# Patient Record
Sex: FEMALE | Race: WHITE | NOT HISPANIC OR LATINO
[De-identification: names, ages, dates, MRNs, and addresses within clinical notes are randomized per-mention and may not be internally consistent; named-entity substitution may affect disease eponyms.]

---

## 2017-01-22 ENCOUNTER — APPOINTMENT (OUTPATIENT)
Dept: MRI IMAGING | Facility: CLINIC | Age: 72
End: 2017-01-22

## 2017-01-22 ENCOUNTER — OUTPATIENT (OUTPATIENT)
Dept: OUTPATIENT SERVICES | Facility: HOSPITAL | Age: 72
LOS: 1 days | End: 2017-01-22
Payer: MEDICARE

## 2017-01-22 DIAGNOSIS — K86.2 CYST OF PANCREAS: ICD-10-CM

## 2017-01-22 PROCEDURE — 74183 MRI ABD W/O CNTR FLWD CNTR: CPT

## 2017-01-22 PROCEDURE — A9585: CPT

## 2017-01-22 PROCEDURE — 82565 ASSAY OF CREATININE: CPT

## 2017-06-19 ENCOUNTER — APPOINTMENT (OUTPATIENT)
Dept: DERMATOLOGY | Facility: CLINIC | Age: 72
End: 2017-06-19

## 2017-12-11 ENCOUNTER — APPOINTMENT (OUTPATIENT)
Dept: PULMONOLOGY | Facility: CLINIC | Age: 72
End: 2017-12-11
Payer: MEDICARE

## 2017-12-11 VITALS — WEIGHT: 216 LBS | BODY MASS INDEX: 37.08 KG/M2

## 2017-12-11 VITALS
BODY MASS INDEX: 38.26 KG/M2 | DIASTOLIC BLOOD PRESSURE: 80 MMHG | HEIGHT: 63 IN | OXYGEN SATURATION: 96 % | SYSTOLIC BLOOD PRESSURE: 128 MMHG | HEART RATE: 78 BPM

## 2017-12-11 DIAGNOSIS — Z00.00 ENCOUNTER FOR GENERAL ADULT MEDICAL EXAMINATION W/OUT ABNORMAL FINDINGS: ICD-10-CM

## 2017-12-11 PROCEDURE — 99215 OFFICE O/P EST HI 40 MIN: CPT | Mod: 25

## 2017-12-11 PROCEDURE — 94010 BREATHING CAPACITY TEST: CPT

## 2017-12-22 ENCOUNTER — APPOINTMENT (OUTPATIENT)
Dept: DERMATOLOGY | Facility: CLINIC | Age: 72
End: 2017-12-22
Payer: MEDICARE

## 2017-12-22 PROCEDURE — 99213 OFFICE O/P EST LOW 20 MIN: CPT

## 2018-01-23 ENCOUNTER — APPOINTMENT (OUTPATIENT)
Dept: PULMONOLOGY | Facility: CLINIC | Age: 73
End: 2018-01-23
Payer: MEDICARE

## 2018-01-23 VITALS
HEIGHT: 63 IN | DIASTOLIC BLOOD PRESSURE: 70 MMHG | OXYGEN SATURATION: 97 % | WEIGHT: 213 LBS | SYSTOLIC BLOOD PRESSURE: 122 MMHG | BODY MASS INDEX: 37.74 KG/M2 | HEART RATE: 66 BPM

## 2018-01-23 PROCEDURE — 99215 OFFICE O/P EST HI 40 MIN: CPT

## 2018-01-23 RX ORDER — DILTIAZEM HYDROCHLORIDE 120 MG/1
120 CAPSULE, EXTENDED RELEASE ORAL
Qty: 90 | Refills: 0 | Status: ACTIVE | COMMUNITY
Start: 2017-04-10

## 2018-06-20 ENCOUNTER — APPOINTMENT (OUTPATIENT)
Dept: DERMATOLOGY | Facility: CLINIC | Age: 73
End: 2018-06-20

## 2018-07-25 ENCOUNTER — APPOINTMENT (OUTPATIENT)
Dept: DERMATOLOGY | Facility: CLINIC | Age: 73
End: 2018-07-25
Payer: MEDICARE

## 2018-07-25 PROCEDURE — 99214 OFFICE O/P EST MOD 30 MIN: CPT | Mod: 25

## 2018-07-25 PROCEDURE — 17003 DESTRUCT PREMALG LES 2-14: CPT

## 2018-07-25 PROCEDURE — 17000 DESTRUCT PREMALG LESION: CPT

## 2018-11-02 ENCOUNTER — APPOINTMENT (OUTPATIENT)
Dept: DERMATOLOGY | Facility: CLINIC | Age: 73
End: 2018-11-02

## 2019-01-25 ENCOUNTER — RESULT REVIEW (OUTPATIENT)
Age: 74
End: 2019-01-25

## 2019-01-25 ENCOUNTER — APPOINTMENT (OUTPATIENT)
Dept: DERMATOLOGY | Facility: CLINIC | Age: 74
End: 2019-01-25
Payer: MEDICARE

## 2019-01-25 PROCEDURE — 99214 OFFICE O/P EST MOD 30 MIN: CPT | Mod: 25

## 2019-01-25 PROCEDURE — 11102 TANGNTL BX SKIN SINGLE LES: CPT

## 2019-03-15 ENCOUNTER — OUTPATIENT (OUTPATIENT)
Dept: OUTPATIENT SERVICES | Facility: HOSPITAL | Age: 74
LOS: 1 days | End: 2019-03-15
Payer: MEDICARE

## 2019-03-15 ENCOUNTER — APPOINTMENT (OUTPATIENT)
Dept: CT IMAGING | Facility: CLINIC | Age: 74
End: 2019-03-15
Payer: MEDICARE

## 2019-03-15 DIAGNOSIS — Z00.8 ENCOUNTER FOR OTHER GENERAL EXAMINATION: ICD-10-CM

## 2019-03-15 PROCEDURE — 70450 CT HEAD/BRAIN W/O DYE: CPT | Mod: 26

## 2019-03-15 PROCEDURE — 70450 CT HEAD/BRAIN W/O DYE: CPT

## 2019-03-29 ENCOUNTER — TRANSCRIPTION ENCOUNTER (OUTPATIENT)
Age: 74
End: 2019-03-29

## 2019-05-07 ENCOUNTER — APPOINTMENT (OUTPATIENT)
Dept: PULMONOLOGY | Facility: CLINIC | Age: 74
End: 2019-05-07
Payer: MEDICARE

## 2019-05-07 VITALS
OXYGEN SATURATION: 96 % | BODY MASS INDEX: 37.38 KG/M2 | WEIGHT: 211 LBS | HEART RATE: 86 BPM | SYSTOLIC BLOOD PRESSURE: 120 MMHG | DIASTOLIC BLOOD PRESSURE: 86 MMHG

## 2019-05-07 PROCEDURE — 99215 OFFICE O/P EST HI 40 MIN: CPT

## 2019-05-07 RX ORDER — AZITHROMYCIN 250 MG/1
250 TABLET, FILM COATED ORAL
Qty: 6 | Refills: 0 | Status: DISCONTINUED | COMMUNITY
Start: 2017-11-02 | End: 2019-05-07

## 2019-05-07 RX ORDER — ESCITALOPRAM OXALATE 5 MG/1
5 TABLET ORAL
Qty: 30 | Refills: 0 | Status: DISCONTINUED | COMMUNITY
Start: 2018-01-06 | End: 2019-05-07

## 2019-05-07 RX ORDER — CYCLOBENZAPRINE HYDROCHLORIDE 5 MG/1
5 TABLET, FILM COATED ORAL
Qty: 30 | Refills: 0 | Status: DISCONTINUED | COMMUNITY
Start: 2017-07-10 | End: 2019-05-07

## 2019-05-07 RX ORDER — PREDNISONE 50 MG/1
50 TABLET ORAL
Qty: 3 | Refills: 0 | Status: DISCONTINUED | COMMUNITY
Start: 2018-01-02 | End: 2019-05-07

## 2019-05-07 RX ORDER — COLD-HOT PACK
EACH MISCELLANEOUS
Refills: 0 | Status: ACTIVE | COMMUNITY

## 2019-05-07 RX ORDER — MAGNESIUM CHLORIDE 71.5 MG
TABLET, DELAYED RELEASE (ENTERIC COATED) ORAL
Refills: 0 | Status: ACTIVE | COMMUNITY

## 2019-05-07 RX ORDER — AMOXICILLIN 875 MG/1
875 TABLET, FILM COATED ORAL
Qty: 20 | Refills: 0 | Status: DISCONTINUED | COMMUNITY
Start: 2017-09-21 | End: 2019-05-07

## 2019-05-07 NOTE — PROCEDURE
[FreeTextEntry1] : Facility: [facility]\par  \par CT chest without contrast\par  \par History:   Abnormal chest CT\par  \par Technique:  Multidetector CT images obtained of the chest without intravenous contrast. Images reformatted in the coronal and sagittal planes.\par  \par This CT exam was performed using one or more the following dose reduction techniques: Automated exposure control, adjustment of the mA and/or kV according to patient's size, use of iterative reconstruction technique. Estimated dose for this examination in total is dose length product (DLP) = 277 mGy-cm.\par  \par Comparison:  1/16/2018.\par  \par Findings:\par  \par 8 x 8 x 10 mm nodule in the lingula (axial image 36, coronal image 64, sagittal image 88), measures slightly larger than on prior study (previously 7 x 7 x 9 mm if measured in similar fashion). Although measurement difference is minimal, recommend further evaluation with PET CT or short-term CT follow-up.\par  \par Left lung granuloma again noted. No new suspicious pulmonary nodules or masses identified. Additional mild dependent and peripheral densities most compatible with atelectasis and scarring. No pleural effusions.\par  \par Limited evaluation of mediastinal and vascular structures without contrast. Loop recorder. Heart is not enlarged. No pericardial effusion noted. Ascending thoracic aorta measures up to 3.2 cm. No mediastinal or axillary lymphadenopathy noted.\par  \par Included upper abdomen demonstrates cholecystectomy clips. No adrenal masses visualized. \par  \par Mild degenerative osseous changes. Stable T12 superior endplate mild compression deformity or Schmorl's node.\par  \par Impression:\par 1. 8 x 8 x 10 mm nodule in the lingula, measures slightly larger than on prior study (previously 7 x 7 x 9 mm if measured in similar fashion). Although difference is minimal, recommend further evaluation with PET-CT or additional CT follow-up.\par  \par 2. Otherwise no significant change. Other findings as above.\par  \par Signed by \par \par Signed	Date/Time		Phone	Pager\par TAMIA MI				\par \par Facility: [facility]\par  \par ESOPHAGRAM\par  \par Clinical Information:73 years old Female presents with Dysphagia, unspecified, Gastroesophageal reflux disease without esophagitis;    \par  \par Comparison: CT chest performed 1/16/2019\par  \par Technique:  radiograph was obtained. Double contrast esophagram evaluation was performed. Fluoroscopy performed by dictating radiologist. Permanent images were acquired.   \par  \par Findings:  radiograph does not demonstrate radiopaque foreign body or evidence of dilated esophagus. Tortuous aorta. Right upper quadrant surgical clips. Electronic device projects over the left suprahilar region.\par  \par Mild esophageal dysmotility was observed without evidence of focal stricture or obstructing intraluminal mass lesion. Correlate clinically with upper endoscopy as warranted. Small, sliding-type hiatal hernia. Contrast flowed freely into the stomach. Mild gastroesophageal reflux was observed. \par  \par FLUORO TIME= 78 seconds\par NUMBER OF IMAGES= 25\par  \par Impression:\par  \par 1. Mild esophageal dysmotility without evidence of focal stricture or obstructing intraluminal mass lesion.\par 2. Mild gastroesophageal reflux was observed.\par  \par Signed by \par \par Signed	Date/Time		Phone	Pager\par JONATHAN DENNEY				\par \par Chest CAT scan reviewed on PACS with the patient.\par

## 2019-05-07 NOTE — DISCUSSION/SUMMARY
[FreeTextEntry1] : 73-year-old female, seen today for the above. Patient's underlying lung lesion had increased by 1 mm in size, but remains well demarcated with a change, most likely due to technique. Followup CAT scan will be performed in July. Case reviewed on PACS with patient

## 2019-05-07 NOTE — PHYSICAL EXAM
[Normal Appearance] : normal appearance [Well Groomed] : well groomed [General Appearance - Well Developed] : well developed [General Appearance - Well Nourished] : well nourished [No Deformities] : no deformities [General Appearance - In No Acute Distress] : no acute distress [Normal Oropharynx] : normal oropharynx [Neck Appearance] : the appearance of the neck was normal [III] : III [Neck Cervical Mass (___cm)] : no neck mass was observed [Jugular Venous Distention Increased] : there was no jugular-venous distention [Thyroid Diffuse Enlargement] : the thyroid was not enlarged [Thyroid Nodule] : there were no palpable thyroid nodules [Neck Circumference: ___] : neck circumference is [unfilled] [Heart Rate And Rhythm] : heart rate and rhythm were normal [Heart Sounds] : normal S1 and S2 [Murmurs] : no murmurs present [Respiration, Rhythm And Depth] : normal respiratory rhythm and effort [Exaggerated Use Of Accessory Muscles For Inspiration] : no accessory muscle use [Auscultation Breath Sounds / Voice Sounds] : lungs were clear to auscultation bilaterally [Abdomen Soft] : soft [Abdomen Tenderness] : non-tender [Abdomen Mass (___ Cm)] : no abdominal mass palpated [Gait - Sufficient For Exercise Testing] : the gait was sufficient for exercise testing [Abnormal Walk] : normal gait [Nail Clubbing] : no clubbing of the fingernails [Cyanosis, Localized] : no localized cyanosis [] : no ischemic changes [Petechial Hemorrhages (___cm)] : no petechial hemorrhages [Deep Tendon Reflexes (DTR)] : deep tendon reflexes were 2+ and symmetric [No Focal Deficits] : no focal deficits [Sensation] : the sensory exam was normal to light touch and pinprick

## 2019-05-07 NOTE — CONSULT LETTER
[Dear  ___] : Dear  [unfilled], [Courtesy Letter:] : I had the pleasure of seeing your patient, [unfilled], in my office today. [Please see my note below.] : Please see my note below. [Sincerely,] : Sincerely, [Vince Loco MD] : Vince Loco MD [FreeTextEntry3] : Vince Loco MD FCCP\par Pulmonary/Critical Care/Sleep Medicine\par Department of Internal Medicine\par \par [facility]\par

## 2019-05-07 NOTE — REASON FOR VISIT
[Follow-Up] : a follow-up visit [Shortness of Breath] : shortness of Breath [Abnormal CT Scan] : abnormal CT Scan

## 2019-05-07 NOTE — HISTORY OF PRESENT ILLNESS
[FreeTextEntry1] : 72-year-old female, seen today in followup of her pulmonary nodule. Patient has not undergone a sleep study at because of fear of losing her hair with the adhesive used for EEG portion. She denies any complaints of cough, wheeze, sputum, fevers, chills, chest pains

## 2019-07-17 ENCOUNTER — APPOINTMENT (OUTPATIENT)
Dept: PULMONOLOGY | Facility: CLINIC | Age: 74
End: 2019-07-17
Payer: MEDICARE

## 2019-07-17 VITALS
HEART RATE: 75 BPM | DIASTOLIC BLOOD PRESSURE: 70 MMHG | OXYGEN SATURATION: 95 % | SYSTOLIC BLOOD PRESSURE: 128 MMHG | WEIGHT: 217 LBS | BODY MASS INDEX: 38.44 KG/M2

## 2019-07-17 PROCEDURE — 99215 OFFICE O/P EST HI 40 MIN: CPT

## 2019-07-17 NOTE — PROCEDURE
[FreeTextEntry1] : Study Result \par \par  \par Facility: [facility]\par  \par  \par  \par CT Angiography Chest with MIP on 6/19/2019:\par  \par Clinical indication:Chest pain; Abdominal pain; Epigastric; \par Additional info: Angio of aorta\par  \par Comparison: 11/12/2017\par  \par Multidetector helical sections were obtained. This was performed \par following IV administration of 100  cc of Omnipaque 350. This was \par performed from the lung apices to the lung bases.  Axial, sagittal \par and coronal reconstructions were obtained.\par  \par Dose Information: Estimated dose for this examination in total is \par DLP 1241 mGy-cm. \par  \par Dose lowering technique: One or more of the following were used, \par automated exposure control, adjustment of the MA and/ or KV \par according to patient size and use of iterative reconstruction \par technique.\par  \par Findings:\par  \par The visualized thyroid gland is unremarkable.\par  \par There is no consolidation.\par  \par There is an 8mm nodule in the area lingular segment of the left \par lung on axial image 72 series 3.  This nodule is of indeterminate \par etiology.  This was present on a prior study dated 11/20/2017.\par  \par Linear atelectasis is suggested in the lingular segment of the left \par lung.\par  \par Pleural thickening and scarring is identified at both lung apices.\par  \par There is no evidence of pulmonary embolism.  \par  \par There is no aortic dissection.\par There is no aortic aneurysm.\par  \par There is no hilar or mediastinal lymphadenopathy.\par  \par There is no right or left pleural effusion.\par  \par The pericardium is unremarkable.\par  \par There is no axillary lymphadenopathy.\par  \par The adrenal glands are unremarkable.  \par  \par A mild compression fracture of T12 is identified which appear to be \par old.\par Degenerative disc disease, thoracic spine.\par  \par Impression:\par  \par There is no evidence of pulmonary embolism.\par  \par There is an 8mm nodule in the lingular segment of the left lung.\par  \par There is no right or left pleural fluid present.\par  \par There is no aortic aneurysm.\par  \par There is no thoracic aortic dissection.\par  \par Other findings, as described above.\par  \par  \par THIS DOCUMENT HAS BEEN ELECTRONICALLY SIGNED BY DAVION GONZALEZ MD\par \par \par

## 2019-07-17 NOTE — CONSULT LETTER
[Dear  ___] : Dear  [unfilled], [Courtesy Letter:] : I had the pleasure of seeing your patient, [unfilled], in my office today. [Please see my note below.] : Please see my note below. [Sincerely,] : Sincerely, [Vince Loco MD] : Vince Loco MD [FreeTextEntry3] : Vince Loco MD FCCP\par Pulmonary/Critical Care/Sleep Medicine\par Department of Internal Medicine\par \par [facility]\par

## 2019-07-17 NOTE — DISCUSSION/SUMMARY
[FreeTextEntry1] : 73-year-old female, seen today for the above. Her chest. CAT scan has remained stable as most likely the basis of chronic scarring. The films were reviewed with the patient and  on PACS. A final CAT scan will be performed in one year.\par \par In regard to the issue of sleep apnea, she now agrees to undergo a home sleep study. Patient will be followed up thereafter

## 2019-07-17 NOTE — HISTORY OF PRESENT ILLNESS
[FreeTextEntry1] : 73-year-old female, seen today in followup of her chest CT. Patient did have a CAT scan done one month earlier secondary to an ER visit at [facility] for back pain. She denies any complaints of cough, wheeze, or sputum production.\par \par She continues to claim of snoring, as well as unrefreshing sleep, and excessive daytime somnolence. She is now willing to undergo a home sleep study

## 2019-07-17 NOTE — PHYSICAL EXAM
[General Appearance - Well Developed] : well developed [Normal Appearance] : normal appearance [Well Groomed] : well groomed [General Appearance - Well Nourished] : well nourished [No Deformities] : no deformities [General Appearance - In No Acute Distress] : no acute distress [Normal Oropharynx] : normal oropharynx [III] : III [Neck Appearance] : the appearance of the neck was normal [Neck Cervical Mass (___cm)] : no neck mass was observed [Jugular Venous Distention Increased] : there was no jugular-venous distention [Thyroid Diffuse Enlargement] : the thyroid was not enlarged [Thyroid Nodule] : there were no palpable thyroid nodules [Neck Circumference: ___] : neck circumference is [unfilled] [Heart Rate And Rhythm] : heart rate and rhythm were normal [Heart Sounds] : normal S1 and S2 [Murmurs] : no murmurs present [Respiration, Rhythm And Depth] : normal respiratory rhythm and effort [Exaggerated Use Of Accessory Muscles For Inspiration] : no accessory muscle use [Auscultation Breath Sounds / Voice Sounds] : lungs were clear to auscultation bilaterally [Abdomen Soft] : soft [Abdomen Tenderness] : non-tender [Abdomen Mass (___ Cm)] : no abdominal mass palpated [Abnormal Walk] : normal gait [Gait - Sufficient For Exercise Testing] : the gait was sufficient for exercise testing [Nail Clubbing] : no clubbing of the fingernails [Cyanosis, Localized] : no localized cyanosis [Petechial Hemorrhages (___cm)] : no petechial hemorrhages [] : no ischemic changes [Deep Tendon Reflexes (DTR)] : deep tendon reflexes were 2+ and symmetric [Sensation] : the sensory exam was normal to light touch and pinprick [No Focal Deficits] : no focal deficits

## 2019-07-26 ENCOUNTER — APPOINTMENT (OUTPATIENT)
Dept: DERMATOLOGY | Facility: CLINIC | Age: 74
End: 2019-07-26
Payer: MEDICARE

## 2019-07-26 PROCEDURE — 99214 OFFICE O/P EST MOD 30 MIN: CPT

## 2019-11-07 ENCOUNTER — APPOINTMENT (OUTPATIENT)
Dept: PULMONOLOGY | Facility: CLINIC | Age: 74
End: 2019-11-07
Payer: MEDICARE

## 2019-11-07 VITALS
RESPIRATION RATE: 16 BRPM | WEIGHT: 219 LBS | BODY MASS INDEX: 38.79 KG/M2 | OXYGEN SATURATION: 98 % | SYSTOLIC BLOOD PRESSURE: 132 MMHG | DIASTOLIC BLOOD PRESSURE: 80 MMHG | HEART RATE: 72 BPM

## 2019-11-07 PROCEDURE — 99213 OFFICE O/P EST LOW 20 MIN: CPT

## 2019-11-07 NOTE — CONSULT LETTER
[Dear  ___] : Dear  [unfilled], [Courtesy Letter:] : I had the pleasure of seeing your patient, [unfilled], in my office today. [Please see my note below.] : Please see my note below. [Sincerely,] : Sincerely, [FreeTextEntry3] : Vince Loco MD FCCP\par Pulmonary/Critical Care/Sleep Medicine\par Department of Internal Medicine\par \par [facility]\par  [Vince Loco MD] : Vince Loco MD

## 2019-11-07 NOTE — HISTORY OF PRESENT ILLNESS
[Obstructive Sleep Apnea] : obstructive sleep apnea [Witnessed Apneas] : witnessed sleep apnea [Snoring] : snoring [Awakes Unrefreshed] : awakening unrefreshed [Awakes with Headache] : headache upon awakening [Awakening With Dry Mouth] : awakening with dry mouth [Recent  Weight Gain] : recent weight gain [To Bed: ___] : ~he/she~ goes to bed at [unfilled] [Arises: ___] : arises at [unfilled] [FreeTextEntry1] : Did not have sleep study

## 2019-11-07 NOTE — PHYSICAL EXAM
[General Appearance - Well Developed] : well developed [Normal Appearance] : normal appearance [Well Groomed] : well groomed [General Appearance - Well Nourished] : well nourished [No Deformities] : no deformities [III] : III [General Appearance - In No Acute Distress] : no acute distress [Normal Oropharynx] : normal oropharynx [Neck Appearance] : the appearance of the neck was normal [Neck Cervical Mass (___cm)] : no neck mass was observed [Thyroid Nodule] : there were no palpable thyroid nodules [Jugular Venous Distention Increased] : there was no jugular-venous distention [Thyroid Diffuse Enlargement] : the thyroid was not enlarged [Neck Circumference: ___] : neck circumference is [unfilled] [Heart Rate And Rhythm] : heart rate and rhythm were normal [Heart Sounds] : normal S1 and S2 [Murmurs] : no murmurs present [Respiration, Rhythm And Depth] : normal respiratory rhythm and effort [Exaggerated Use Of Accessory Muscles For Inspiration] : no accessory muscle use [Auscultation Breath Sounds / Voice Sounds] : lungs were clear to auscultation bilaterally [Abdomen Tenderness] : non-tender [Abdomen Soft] : soft [Abdomen Mass (___ Cm)] : no abdominal mass palpated [Abnormal Walk] : normal gait [Gait - Sufficient For Exercise Testing] : the gait was sufficient for exercise testing [Cyanosis, Localized] : no localized cyanosis [Nail Clubbing] : no clubbing of the fingernails [Petechial Hemorrhages (___cm)] : no petechial hemorrhages [] : no ischemic changes [Sensation] : the sensory exam was normal to light touch and pinprick [No Focal Deficits] : no focal deficits [Deep Tendon Reflexes (DTR)] : deep tendon reflexes were 2+ and symmetric

## 2020-01-21 ENCOUNTER — APPOINTMENT (OUTPATIENT)
Dept: PULMONOLOGY | Facility: CLINIC | Age: 75
End: 2020-01-21
Payer: MEDICARE

## 2020-01-21 VITALS — DIASTOLIC BLOOD PRESSURE: 78 MMHG | HEART RATE: 79 BPM | SYSTOLIC BLOOD PRESSURE: 142 MMHG | OXYGEN SATURATION: 98 %

## 2020-01-21 VITALS — HEIGHT: 63.5 IN | WEIGHT: 222 LBS | BODY MASS INDEX: 38.85 KG/M2

## 2020-01-21 DIAGNOSIS — G47.10 HYPERSOMNIA, UNSPECIFIED: ICD-10-CM

## 2020-01-21 PROCEDURE — 99214 OFFICE O/P EST MOD 30 MIN: CPT | Mod: 25

## 2020-01-21 PROCEDURE — 94010 BREATHING CAPACITY TEST: CPT

## 2020-01-21 NOTE — DISCUSSION/SUMMARY
[Obstructive Sleep Apnea] : obstructive sleep apnea [Alcohol Avoidance] : alcohol avoidance [Severe] : severe [Sedative Avoidance] : sedative avoidance [Weight Loss Program] : weight loss program [CPAP] : CPAP [de-identified] : Resmed Airsense 10 autoset (for her) in a range 4-16 with N20 mask. The pathophysiology of sleep was explained to the patient in detail. Inclusive of this was the reasoning behind and the expected response to positive airway pressure therapy. Compliance was outlined including further followup [FreeTextEntry1] : Pending f/u CCT

## 2020-01-21 NOTE — PHYSICAL EXAM
[General Appearance - Well Developed] : well developed [Well Groomed] : well groomed [Normal Appearance] : normal appearance [General Appearance - Well Nourished] : well nourished [General Appearance - In No Acute Distress] : no acute distress [No Deformities] : no deformities [Neck Appearance] : the appearance of the neck was normal [Normal Oropharynx] : normal oropharynx [III] : III [Neck Cervical Mass (___cm)] : no neck mass was observed [Jugular Venous Distention Increased] : there was no jugular-venous distention [Thyroid Diffuse Enlargement] : the thyroid was not enlarged [Thyroid Nodule] : there were no palpable thyroid nodules [Neck Circumference: ___] : neck circumference is [unfilled] [Heart Sounds] : normal S1 and S2 [Heart Rate And Rhythm] : heart rate and rhythm were normal [Murmurs] : no murmurs present [Respiration, Rhythm And Depth] : normal respiratory rhythm and effort [Exaggerated Use Of Accessory Muscles For Inspiration] : no accessory muscle use [Auscultation Breath Sounds / Voice Sounds] : lungs were clear to auscultation bilaterally [Abdomen Soft] : soft [Abdomen Tenderness] : non-tender [Abdomen Mass (___ Cm)] : no abdominal mass palpated [Abnormal Walk] : normal gait [Nail Clubbing] : no clubbing of the fingernails [Gait - Sufficient For Exercise Testing] : the gait was sufficient for exercise testing [Cyanosis, Localized] : no localized cyanosis [Petechial Hemorrhages (___cm)] : no petechial hemorrhages [] : no ischemic changes [Deep Tendon Reflexes (DTR)] : deep tendon reflexes were 2+ and symmetric [Sensation] : the sensory exam was normal to light touch and pinprick [No Focal Deficits] : no focal deficits

## 2020-01-21 NOTE — HISTORY OF PRESENT ILLNESS
[Obstructive Sleep Apnea] : obstructive sleep apnea [Snoring] : snoring [Witnessed Apneas] : witnessed sleep apnea [Awakes Unrefreshed] : awakening unrefreshed [Awakes with Headache] : headache upon awakening [Awakening With Dry Mouth] : awakening with dry mouth [Recent  Weight Gain] : recent weight gain [To Bed: ___] : ~he/she~ goes to bed at [unfilled] [Arises: ___] : arises at [unfilled] [FreeTextEntry1] : s/p HST

## 2020-01-21 NOTE — CONSULT LETTER
[Dear  ___] : Dear  [unfilled], [Courtesy Letter:] : I had the pleasure of seeing your patient, [unfilled], in my office today. [Please see my note below.] : Please see my note below. [Sincerely,] : Sincerely, [Vince Loco MD] : Vince Loco MD [FreeTextEntry3] : Vince Loco MD FCCP\par Pulmonary/Critical Care/Sleep Medicine\par Department of Internal Medicine\par \par [facility]\par

## 2020-06-17 ENCOUNTER — APPOINTMENT (OUTPATIENT)
Dept: CT IMAGING | Facility: CLINIC | Age: 75
End: 2020-06-17

## 2020-07-01 ENCOUNTER — APPOINTMENT (OUTPATIENT)
Dept: CT IMAGING | Facility: CLINIC | Age: 75
End: 2020-07-01
Payer: MEDICARE

## 2020-07-01 ENCOUNTER — OUTPATIENT (OUTPATIENT)
Dept: OUTPATIENT SERVICES | Facility: HOSPITAL | Age: 75
LOS: 1 days | End: 2020-07-01
Payer: MEDICARE

## 2020-07-01 ENCOUNTER — RESULT REVIEW (OUTPATIENT)
Age: 75
End: 2020-07-01

## 2020-07-01 DIAGNOSIS — Z00.00 ENCOUNTER FOR GENERAL ADULT MEDICAL EXAMINATION WITHOUT ABNORMAL FINDINGS: ICD-10-CM

## 2020-07-01 DIAGNOSIS — R93.89 ABNORMAL FINDINGS ON DIAGNOSTIC IMAGING OF OTHER SPECIFIED BODY STRUCTURES: ICD-10-CM

## 2020-07-01 PROCEDURE — 71250 CT THORAX DX C-: CPT

## 2020-07-01 PROCEDURE — 71250 CT THORAX DX C-: CPT | Mod: 26

## 2020-07-09 ENCOUNTER — APPOINTMENT (OUTPATIENT)
Dept: NUCLEAR MEDICINE | Facility: CLINIC | Age: 75
End: 2020-07-09
Payer: MEDICARE

## 2020-07-09 ENCOUNTER — OUTPATIENT (OUTPATIENT)
Dept: OUTPATIENT SERVICES | Facility: HOSPITAL | Age: 75
LOS: 1 days | End: 2020-07-09

## 2020-07-09 DIAGNOSIS — R93.89 ABNORMAL FINDINGS ON DIAGNOSTIC IMAGING OF OTHER SPECIFIED BODY STRUCTURES: ICD-10-CM

## 2020-07-09 PROCEDURE — 78815 PET IMAGE W/CT SKULL-THIGH: CPT | Mod: 26,PI

## 2020-07-27 ENCOUNTER — APPOINTMENT (OUTPATIENT)
Dept: DERMATOLOGY | Facility: CLINIC | Age: 75
End: 2020-07-27
Payer: MEDICARE

## 2020-07-27 PROCEDURE — 99214 OFFICE O/P EST MOD 30 MIN: CPT

## 2020-12-17 ENCOUNTER — APPOINTMENT (OUTPATIENT)
Dept: PULMONOLOGY | Facility: CLINIC | Age: 75
End: 2020-12-17

## 2021-01-12 ENCOUNTER — OUTPATIENT (OUTPATIENT)
Dept: OUTPATIENT SERVICES | Facility: HOSPITAL | Age: 76
LOS: 1 days | End: 2021-01-12
Payer: MEDICARE

## 2021-01-12 ENCOUNTER — APPOINTMENT (OUTPATIENT)
Dept: CT IMAGING | Facility: CLINIC | Age: 76
End: 2021-01-12
Payer: MEDICARE

## 2021-01-12 ENCOUNTER — RESULT REVIEW (OUTPATIENT)
Age: 76
End: 2021-01-12

## 2021-01-12 DIAGNOSIS — R91.1 SOLITARY PULMONARY NODULE: ICD-10-CM

## 2021-01-12 PROCEDURE — 71250 CT THORAX DX C-: CPT

## 2021-01-12 PROCEDURE — 71250 CT THORAX DX C-: CPT | Mod: 26

## 2021-01-20 ENCOUNTER — APPOINTMENT (OUTPATIENT)
Dept: PULMONOLOGY | Facility: CLINIC | Age: 76
End: 2021-01-20
Payer: MEDICARE

## 2021-01-20 VITALS
SYSTOLIC BLOOD PRESSURE: 120 MMHG | BODY MASS INDEX: 37.14 KG/M2 | HEART RATE: 73 BPM | DIASTOLIC BLOOD PRESSURE: 70 MMHG | OXYGEN SATURATION: 95 % | RESPIRATION RATE: 16 BRPM | WEIGHT: 213 LBS

## 2021-01-20 PROCEDURE — 99215 OFFICE O/P EST HI 40 MIN: CPT

## 2021-01-20 RX ORDER — FLUTICASONE PROPIONATE 50 UG/1
50 SPRAY, METERED NASAL
Qty: 48 | Refills: 0 | Status: COMPLETED | COMMUNITY
Start: 2017-07-10 | End: 2021-01-20

## 2021-01-20 RX ORDER — RANITIDINE 150 MG/1
150 TABLET ORAL
Qty: 90 | Refills: 0 | Status: COMPLETED | COMMUNITY
Start: 2017-04-06 | End: 2021-01-20

## 2021-01-20 NOTE — CONSULT LETTER
[Dear  ___] : Dear  [unfilled], [Courtesy Letter:] : I had the pleasure of seeing your patient, [unfilled], in my office today. [Please see my note below.] : Please see my note below. [Sincerely,] : Sincerely, [FreeTextEntry3] : Vince Loco MD FCCP\par Pulmonary/Critical Care/Sleep Medicine\par Department of Internal Medicine\par \par [facility]\par

## 2021-01-20 NOTE — PHYSICAL EXAM
[No Acute Distress] : no acute distress [Normal Oropharynx] : normal oropharynx [Normal Appearance] : normal appearance [No Neck Mass] : no neck mass [Normal Rate/Rhythm] : normal rate/rhythm [Normal S1, S2] : normal s1, s2 [No Murmurs] : no murmurs [No Resp Distress] : no resp distress [Clear to Auscultation Bilaterally] : clear to auscultation bilaterally [No Abnormalities] : no abnormalities [Benign] : benign [Normal Gait] : normal gait [No Clubbing] : no clubbing [No Cyanosis] : no cyanosis [No Edema] : no edema [FROM] : FROM [Normal Color/ Pigmentation] : normal color/ pigmentation [No Focal Deficits] : no focal deficits [Oriented x3] : oriented x3 [Normal Affect] : normal affect [IV] : Mallampati Class: IV

## 2021-01-20 NOTE — DISCUSSION/SUMMARY
[Obstructive Sleep Apnea] : obstructive sleep apnea [Severe] : severe [Alcohol Avoidance] : alcohol avoidance [Sedative Avoidance] : sedative avoidance [Weight Loss Program] : weight loss program [Patient] : discussed with the patient [de-identified] : restart CPAP. Resmed Airsense 10 autoset (for her) in a range 4-16 with N20 mask [FreeTextEntry1] : Pulm nodule stable x 2 yrs. Benign. No further w/u necessary

## 2021-01-20 NOTE — HISTORY OF PRESENT ILLNESS
[TextBox_4] : Patient never accepted her CPAP machine during the onset of Covid 19. She is here to consider reinitiation of therapy. She continues to complain of unrefreshing sleep, daytime somnolence, snoring, and restless sleep. [ESS] : 6

## 2021-02-04 ENCOUNTER — APPOINTMENT (OUTPATIENT)
Age: 76
End: 2021-02-04

## 2021-07-26 ENCOUNTER — APPOINTMENT (OUTPATIENT)
Dept: DERMATOLOGY | Facility: CLINIC | Age: 76
End: 2021-07-26
Payer: MEDICARE

## 2021-07-26 PROCEDURE — 99213 OFFICE O/P EST LOW 20 MIN: CPT

## 2021-10-06 ENCOUNTER — APPOINTMENT (OUTPATIENT)
Age: 76
End: 2021-10-06
Payer: MEDICARE

## 2021-10-06 DIAGNOSIS — M53.9 DORSOPATHY, UNSPECIFIED: ICD-10-CM

## 2021-10-06 DIAGNOSIS — K46.9 UNSPECIFIED ABDOMINAL HERNIA W/OUT OBSTRUCTION OR GANGRENE: ICD-10-CM

## 2021-10-06 DIAGNOSIS — S39.92XA UNSPECIFIED INJURY OF LOWER BACK, INITIAL ENCOUNTER: ICD-10-CM

## 2021-10-06 DIAGNOSIS — Z83.3 FAMILY HISTORY OF DIABETES MELLITUS: ICD-10-CM

## 2021-10-06 DIAGNOSIS — R35.1 NOCTURIA: ICD-10-CM

## 2021-10-06 DIAGNOSIS — R39.13 SPLITTING OF URINARY STREAM: ICD-10-CM

## 2021-10-06 DIAGNOSIS — K76.0 FATTY (CHANGE OF) LIVER, NOT ELSEWHERE CLASSIFIED: ICD-10-CM

## 2021-10-06 DIAGNOSIS — R33.9 RETENTION OF URINE, UNSPECIFIED: ICD-10-CM

## 2021-10-06 DIAGNOSIS — E78.00 PURE HYPERCHOLESTEROLEMIA, UNSPECIFIED: ICD-10-CM

## 2021-10-06 DIAGNOSIS — Z87.19 PERSONAL HISTORY OF OTHER DISEASES OF THE DIGESTIVE SYSTEM: ICD-10-CM

## 2021-10-06 DIAGNOSIS — E66.9 OBESITY, UNSPECIFIED: ICD-10-CM

## 2021-10-06 DIAGNOSIS — I51.9 HEART DISEASE, UNSPECIFIED: ICD-10-CM

## 2021-10-06 DIAGNOSIS — Z82.3 FAMILY HISTORY OF STROKE: ICD-10-CM

## 2021-10-06 DIAGNOSIS — R10.2 PELVIC AND PERINEAL PAIN: ICD-10-CM

## 2021-10-06 DIAGNOSIS — Z78.9 OTHER SPECIFIED HEALTH STATUS: ICD-10-CM

## 2021-10-06 DIAGNOSIS — R35.0 FREQUENCY OF MICTURITION: ICD-10-CM

## 2021-10-06 DIAGNOSIS — Z87.01 PERSONAL HISTORY OF PNEUMONIA (RECURRENT): ICD-10-CM

## 2021-10-06 DIAGNOSIS — Z83.6 FAMILY HISTORY OF OTHER DISEASES OF THE RESPIRATORY SYSTEM: ICD-10-CM

## 2021-10-06 DIAGNOSIS — Z83.49 FAMILY HISTORY OF OTHER ENDOCRINE, NUTRITIONAL AND METABOLIC DISEASES: ICD-10-CM

## 2021-10-06 DIAGNOSIS — R15.9 FULL INCONTINENCE OF FECES: ICD-10-CM

## 2021-10-06 LAB
BILIRUB UR QL STRIP: NEGATIVE
CLARITY UR: CLEAR
COLLECTION METHOD: NORMAL
GLUCOSE UR-MCNC: NEGATIVE
HCG UR QL: 0.2 EU/DL
HGB UR QL STRIP.AUTO: NORMAL
KETONES UR-MCNC: NEGATIVE
LEUKOCYTE ESTERASE UR QL STRIP: NORMAL
NITRITE UR QL STRIP: NEGATIVE
PH UR STRIP: 6
PROT UR STRIP-MCNC: NEGATIVE
SP GR UR STRIP: 1.02

## 2021-10-06 PROCEDURE — 99205 OFFICE O/P NEW HI 60 MIN: CPT | Mod: 25

## 2021-10-06 PROCEDURE — 51701 INSERT BLADDER CATHETER: CPT

## 2021-10-06 PROCEDURE — 81003 URINALYSIS AUTO W/O SCOPE: CPT | Mod: QW

## 2021-10-06 RX ORDER — FAMOTIDINE 10 MG/1
TABLET, FILM COATED ORAL
Refills: 0 | Status: ACTIVE | COMMUNITY

## 2021-10-06 RX ORDER — ZINC SULFATE 50(220)MG
CAPSULE ORAL
Refills: 0 | Status: ACTIVE | COMMUNITY

## 2021-10-06 RX ORDER — VIT C/ZN GLUC/HERBAL NO.325 90 MG-15MG
LOZENGE MUCOUS MEMBRANE
Refills: 0 | Status: ACTIVE | COMMUNITY

## 2021-10-06 RX ORDER — CONJUGATED ESTROGENS 0.62 MG/G
0.62 CREAM VAGINAL
Qty: 30 | Refills: 2 | Status: ACTIVE | COMMUNITY
Start: 2021-10-06 | End: 1900-01-01

## 2021-10-06 NOTE — PROCEDURE
[Straight Catheterization] : insertion of a straight catheter [Urgent Incontinence] : urgent incontinence [Patient] : the patient [___ Fr Straight Tip] : a [unfilled] in [place] straight tip catheter [None] : there were no complications with the catheter insertion [No Complications] : no complications [Tolerated Well] : the patient tolerated the procedure well [Post procedure instructions and information given] : Post procedure instructions and information were given and reviewed with patient. [0] : 0

## 2021-10-06 NOTE — PHYSICAL EXAM
[Chaperone Present] : A chaperone was present in the examining room during all aspects of the physical examination [No Acute Distress] : in no acute distress [Well developed] : well developed [Well Nourished] : ~L well nourished [Oriented x3] : oriented to person, place, and time [Normal Memory] : ~T memory was ~L unimpaired [Normal Mood/Affect] : mood and affect are normal [Cough] : no cough [No Edema] : ~T edema was not present [Tenderness] : ~T no ~M abdominal tenderness observed [Distended] : not distended [None] : no CVA tenderness [Inguinal LAD] : no adenopathy was noted in the inguinal lymph nodes [Warm and Dry] : was warm and dry to touch [Normal Gait] : gait was normal [Vulvar Atrophy] : vulvar atrophy [Labia Majora] : were normal [Labia Minora] : were normal [Atrophy] : atrophy [No Bleeding] : there was no active vaginal bleeding [1] : 1 [Aa ____] : Aa [unfilled] [Ba ____] : Ba [unfilled] [C ____] : C [unfilled] [GH ____] : GH [unfilled] [PB ____] : PB [unfilled] [TVL ____] : TVL  [unfilled] [Ap ____] : Ap [unfilled] [Bp ____] : Bp [unfilled] [D ____] : D [unfilled] [Soft] :  the cervix was soft [Normal] : no abnormalities [Post Void Residual ____ml] : post void residual was [unfilled] ml [FreeTextEntry3] : neg CST

## 2021-10-06 NOTE — HISTORY OF PRESENT ILLNESS
[Cystocele (Obstetric)] : no [Vaginal Wall Prolapse] : no [Rectal Prolapse] : no [Unable To Restrain Bowel Movement] : mild [x2] : nocturia two times a night [Urinary Tract Infection] : mild [Urinary Frequency] : no [Feelings Of Urinary Urgency] : no [Pain During Urination (Dysuria)] : no [Constipation Obstructed Defecation] : no [] : no [Pelvic Pain] : no [Vaginal Pain] : no [Vulvar Pain] : no [FreeTextEntry1] : \par Eleni presents for evaluation of UTI, she reports for the past few months she has had UTI symptoms, she reports that she has had a UTI for the past month,4 UTIs this past year,  she reports that she cannot take any abx bc she had a reaction to vantin with a rash, she reports that macrobid gives her a rash, was told that she cannot take levaquin due to reaction with an IV, she recently was seen by ID and was given monurol, she is taking it today , she reports that she has had UTIs her entire life, right now she has no symptoms, she reports the night before her symptoms start she gets a tingle and then the next am there is gross hematuria, she reports having a cystoscopy many years ago, she reports she is unsure if she empties, she reports leakage with coughing/urgency, she reports waking up 2-3 times at night, she reports rare pelvic pain, no bulge symptoms, wears a med pad all the time and most times it is dry, every now and then she will get UUI, denies constipation, one life time episode of fecal incontinence, had cologuard 2 yrs ago, but now needs to have a colonoscopyl\par \par she has a history of renal cyst- exophytic - stable and has been evaluated by urology\par \par last cx ecoli

## 2021-10-06 NOTE — DISCUSSION/SUMMARY
[FreeTextEntry1] : \par Eleni presents with recurrent UTIs, asymptomatic cystocele on exam, normal PVR. . We discussed etiology and management of recurrent urinary tract infections. We discussed behavioral modifications including increased oral intake, cranberry tablets, wiping front to back, d mannose. We discussed management of recurrent UTIs with vaginal estrogen and antibiotic prophylaxis. She is interested in vaginal estrogen and discussed risks/benefits and use.   We discussed that is she is symptomatic I prefer for catheterized specimen however if she cannot come to the office, discussed u/a and culture prior to antibiotic treatment. Will get cysto due to h/o hematuria. Has renal imaging. All questions were answered. \par \par [] allergist for abx allergies\par [] u/a, cx- taking monurol today, but asymptomatic \par [] vag estrogen\par [] cysto

## 2021-10-08 ENCOUNTER — NON-APPOINTMENT (OUTPATIENT)
Age: 76
End: 2021-10-08

## 2021-10-08 LAB
APPEARANCE: CLEAR
BACTERIA UR CULT: NORMAL
BACTERIA: NEGATIVE
BILIRUBIN URINE: NEGATIVE
BLOOD URINE: NEGATIVE
COLOR: YELLOW
GLUCOSE QUALITATIVE U: NEGATIVE
HYALINE CASTS: 0 /LPF
KETONES URINE: NEGATIVE
LEUKOCYTE ESTERASE URINE: NEGATIVE
MICROSCOPIC-UA: NORMAL
NITRITE URINE: NEGATIVE
PH URINE: 6
PROTEIN URINE: NEGATIVE
RED BLOOD CELLS URINE: 3 /HPF
SPECIFIC GRAVITY URINE: 1.02
SQUAMOUS EPITHELIAL CELLS: 0 /HPF
UROBILINOGEN URINE: NORMAL
WHITE BLOOD CELLS URINE: 1 /HPF

## 2021-11-10 ENCOUNTER — APPOINTMENT (OUTPATIENT)
Age: 76
End: 2021-11-10
Payer: MEDICARE

## 2021-11-10 DIAGNOSIS — R32 UNSPECIFIED URINARY INCONTINENCE: ICD-10-CM

## 2021-11-10 PROCEDURE — 52000 CYSTOURETHROSCOPY: CPT

## 2022-01-05 ENCOUNTER — APPOINTMENT (OUTPATIENT)
Dept: UROGYNECOLOGY | Facility: CLINIC | Age: 77
End: 2022-01-05
Payer: MEDICARE

## 2022-01-05 ENCOUNTER — RESULT CHARGE (OUTPATIENT)
Age: 77
End: 2022-01-05

## 2022-01-05 DIAGNOSIS — N39.0 URINARY TRACT INFECTION, SITE NOT SPECIFIED: ICD-10-CM

## 2022-01-05 DIAGNOSIS — R31.9 HEMATURIA, UNSPECIFIED: ICD-10-CM

## 2022-01-05 DIAGNOSIS — N39.41 URGE INCONTINENCE: ICD-10-CM

## 2022-01-05 LAB
BILIRUB UR QL STRIP: NEGATIVE
CLARITY UR: CLEAR
COLLECTION METHOD: NORMAL
GLUCOSE UR-MCNC: NEGATIVE
HCG UR QL: 0.2 EU/DL
HGB UR QL STRIP.AUTO: NORMAL
KETONES UR-MCNC: NEGATIVE
LEUKOCYTE ESTERASE UR QL STRIP: NORMAL
NITRITE UR QL STRIP: NEGATIVE
PH UR STRIP: 5.5
PROT UR STRIP-MCNC: NEGATIVE
SP GR UR STRIP: 1.01

## 2022-01-05 PROCEDURE — 51701 INSERT BLADDER CATHETER: CPT

## 2022-01-05 PROCEDURE — 99213 OFFICE O/P EST LOW 20 MIN: CPT | Mod: 25

## 2022-01-05 NOTE — PROCEDURE
[Straight Catheterization] : insertion of a straight catheter [Urinary Tract Infection] : a urinary tract infection [Hematuria] : hematuria [Patient] : the patient [___ Fr Straight Tip] : a [unfilled] in [place] straight tip catheter [None] : there were no complications with the catheter insertion [Clear] : clear [Culture] : culture [Urinalysis] : urinalysis [No Complications] : no complications [Tolerated Well] : the patient tolerated the procedure well [Post procedure instructions and information given] : Post procedure instructions and information were given and reviewed with patient. [0] : 0 [FreeTextEntry9] : Pt was unable to void in office.  150ml urine drained via cath

## 2022-01-05 NOTE — DISCUSSION/SUMMARY
[FreeTextEntry1] : In office dip indicates likely UTI.  Will send UA CS.  Pt has one dose of monurol at home and I did tell her that if symptoms worsen, she can take that.  If CS negative, will get CTU for gross hematuria.  We discussed in detail the risks/benefits of low dose vaginal estrogen and she is agreeable to start this.  Instructed to call with any questions or concerns and she verbalizes understanding.

## 2022-01-05 NOTE — HISTORY OF PRESENT ILLNESS
[FreeTextEntry1] : Pt presents to office for f/u on recurrent UTI.  She was seen for cysto 11/10/21 (negative) and advised to start vaginal estrogen at that time.  Per pt, she did not start this as she was very worried about potential SE and the fact that her mom  of breast cancer.  She c/o UUI, blood in the urine and dysuria yesterday.  She increased her water intake (60-70oz yesterday) and tried cystex with some relief and was feeling better around 2am but concerned she has a UTI today.  No further gross hematuria.  She is scheduled to see an allergist next week as she had a rash with vantin, levaquin and macrobid.  Has taken monurol in the past and did well.

## 2022-01-07 DIAGNOSIS — R31.29 OTHER MICROSCOPIC HEMATURIA: ICD-10-CM

## 2022-01-07 LAB
APPEARANCE: CLEAR
BACTERIA UR CULT: NORMAL
BACTERIA: NEGATIVE
BILIRUBIN URINE: NEGATIVE
BLOOD URINE: ABNORMAL
COLOR: NORMAL
GLUCOSE QUALITATIVE U: NEGATIVE
HYALINE CASTS: 0 /LPF
KETONES URINE: NEGATIVE
LEUKOCYTE ESTERASE URINE: ABNORMAL
MICROSCOPIC-UA: NORMAL
NITRITE URINE: NEGATIVE
PH URINE: 6
PROTEIN URINE: NORMAL
RED BLOOD CELLS URINE: 10 /HPF
SPECIFIC GRAVITY URINE: 1.01
SQUAMOUS EPITHELIAL CELLS: 1 /HPF
UROBILINOGEN URINE: NORMAL
WHITE BLOOD CELLS URINE: 20 /HPF

## 2022-01-10 ENCOUNTER — APPOINTMENT (OUTPATIENT)
Dept: UROGYNECOLOGY | Facility: CLINIC | Age: 77
End: 2022-01-10

## 2022-01-25 DIAGNOSIS — Z87.891 PERSONAL HISTORY OF NICOTINE DEPENDENCE: ICD-10-CM

## 2022-02-08 ENCOUNTER — APPOINTMENT (OUTPATIENT)
Dept: CT IMAGING | Facility: CLINIC | Age: 77
End: 2022-02-08
Payer: MEDICARE

## 2022-02-08 ENCOUNTER — OUTPATIENT (OUTPATIENT)
Dept: OUTPATIENT SERVICES | Facility: HOSPITAL | Age: 77
LOS: 1 days | End: 2022-02-08
Payer: MEDICARE

## 2022-02-08 DIAGNOSIS — R93.89 ABNORMAL FINDINGS ON DIAGNOSTIC IMAGING OF OTHER SPECIFIED BODY STRUCTURES: ICD-10-CM

## 2022-02-08 PROCEDURE — G1004: CPT

## 2022-02-08 PROCEDURE — 71250 CT THORAX DX C-: CPT | Mod: 26,MG

## 2022-02-08 PROCEDURE — 71250 CT THORAX DX C-: CPT | Mod: MG

## 2022-02-11 ENCOUNTER — NON-APPOINTMENT (OUTPATIENT)
Age: 77
End: 2022-02-11

## 2022-02-15 ENCOUNTER — APPOINTMENT (OUTPATIENT)
Dept: PULMONOLOGY | Facility: CLINIC | Age: 77
End: 2022-02-15
Payer: MEDICARE

## 2022-02-15 VITALS
OXYGEN SATURATION: 97 % | DIASTOLIC BLOOD PRESSURE: 80 MMHG | BODY MASS INDEX: 37.14 KG/M2 | HEART RATE: 69 BPM | WEIGHT: 213 LBS | SYSTOLIC BLOOD PRESSURE: 130 MMHG

## 2022-02-15 PROCEDURE — 99215 OFFICE O/P EST HI 40 MIN: CPT

## 2022-02-15 NOTE — END OF VISIT
[FreeTextEntry3] : PACS review with patient [Time Spent: ___ minutes] : I have spent [unfilled] minutes of time on the encounter.

## 2022-02-15 NOTE — HISTORY OF PRESENT ILLNESS
[Home] : home [TextBox_100] : 12/17/2019 [TextBox_108] : 30.2 [Former] : former [TextBox_4] : Seen today76-year-old female with a history of severe obstructive sleep apnea untreated for follow-up of a pulmonary nodule which was first seen in 2017.  At that time the nodule measured 7 mm in size.  Patient denies any complaints of cough wheeze shortness of breath chest pains palpitations lightheadedness or dizziness.

## 2022-02-15 NOTE — DISCUSSION/SUMMARY
[FreeTextEntry1] : 76-year-old female seen today for the above.  Patient's findings on CAT scan remain in question.  The lesion appears stable and PET negative over the course of the last 2 years.  There was a slight increase in size from 0858-9586 which may have been technical in origin.  In light of the lack of progression I am recommending the patient have follow-up CT in 1 year.\par \par In regard to her obstructive sleep apnea which was severe based on home studies in 2019 patient is disinterested in pursuing any further work-up or therapy.

## 2022-02-15 NOTE — CONSULT LETTER
[Dear  ___] : Dear  [unfilled], [Consult Letter:] : I had the pleasure of evaluating your patient, [unfilled]. [Please see my note below.] : Please see my note below. [Consult Closing:] : Thank you very much for allowing me to participate in the care of this patient.  If you have any questions, please do not hesitate to contact me. [Sincerely,] : Sincerely, [FreeTextEntry3] : Vince Loco MD FCCP\par Pulmonary/Critical Care/Sleep Medicine\par Department of Internal Medicine\par \par [facility]\par

## 2022-04-11 ENCOUNTER — APPOINTMENT (OUTPATIENT)
Dept: DERMATOLOGY | Facility: CLINIC | Age: 77
End: 2022-04-11
Payer: MEDICARE

## 2022-04-11 PROCEDURE — 99213 OFFICE O/P EST LOW 20 MIN: CPT

## 2022-07-18 ENCOUNTER — APPOINTMENT (OUTPATIENT)
Dept: PULMONOLOGY | Facility: CLINIC | Age: 77
End: 2022-07-18

## 2022-07-18 VITALS — WEIGHT: 208 LBS | BODY MASS INDEX: 36.27 KG/M2

## 2022-07-18 VITALS
HEART RATE: 76 BPM | DIASTOLIC BLOOD PRESSURE: 80 MMHG | SYSTOLIC BLOOD PRESSURE: 120 MMHG | RESPIRATION RATE: 16 BRPM | OXYGEN SATURATION: 97 %

## 2022-07-18 DIAGNOSIS — R91.1 SOLITARY PULMONARY NODULE: ICD-10-CM

## 2022-07-18 PROCEDURE — 99215 OFFICE O/P EST HI 40 MIN: CPT

## 2022-07-18 RX ORDER — APIXABAN 5 MG/1
5 TABLET, FILM COATED ORAL
Qty: 180 | Refills: 0 | Status: ACTIVE | COMMUNITY
Start: 2022-03-01

## 2022-07-18 RX ORDER — ERGOCALCIFEROL 1.25 MG/1
1.25 MG CAPSULE, LIQUID FILLED ORAL
Qty: 12 | Refills: 0 | Status: ACTIVE | COMMUNITY
Start: 2022-02-24

## 2022-07-18 NOTE — END OF VISIT
[Time Spent: ___ minutes] : I have spent [unfilled] minutes of time on the encounter. [FreeTextEntry3] : PACS review with patient

## 2022-07-18 NOTE — CONSULT LETTER
[Dear  ___] : Dear  [unfilled], [Consult Letter:] : I had the pleasure of evaluating your patient, [unfilled]. [Please see my note below.] : Please see my note below. [Consult Closing:] : Thank you very much for allowing me to participate in the care of this patient.  If you have any questions, please do not hesitate to contact me. [Sincerely,] : Sincerely, [FreeTextEntry3] : Vince Loco MD FCCP\par Pulmonary/Critical Care/Sleep Medicine\par Department of Internal Medicine\par \par [facility]\par

## 2022-07-18 NOTE — DISCUSSION/SUMMARY
[FreeTextEntry1] : 76-year-old female seen today for follow-up of the above.  Patient's lung lesion may be slightly increased versus technique.  A follow-up CAT scan will therefore be performed in 6 months.  In regard to her sleep apnea the patient has been once again advised to pursue treatment.

## 2022-07-18 NOTE — HISTORY OF PRESENT ILLNESS
[Former] : former [TextBox_4] : 76-year-old female with a history of severe obstructive sleep apnea refusing to wear CPAP.  Seen today to readdress her lingula lung nodule.  Patient recently underwent an abdominal pelvic CAT scan at a new radiologist without comparison to the previous study.  She denies any complaints of cough wheezing shortness of breath changes in weight changes in bowel or urinary habits.  She did have left lower quadrant pain was felt to have mild diverticulosis.

## 2022-09-01 ENCOUNTER — APPOINTMENT (OUTPATIENT)
Dept: DERMATOLOGY | Facility: CLINIC | Age: 77
End: 2022-09-01

## 2022-09-01 PROCEDURE — 99213 OFFICE O/P EST LOW 20 MIN: CPT

## 2022-10-04 ENCOUNTER — APPOINTMENT (OUTPATIENT)
Dept: CT IMAGING | Facility: CLINIC | Age: 77
End: 2022-10-04

## 2022-10-04 ENCOUNTER — OUTPATIENT (OUTPATIENT)
Dept: OUTPATIENT SERVICES | Facility: HOSPITAL | Age: 77
LOS: 1 days | End: 2022-10-04
Payer: MEDICARE

## 2022-10-04 DIAGNOSIS — K57.32 DIVERTICULITIS OF LARGE INTESTINE WITHOUT PERFORATION OR ABSCESS WITHOUT BLEEDING: ICD-10-CM

## 2022-10-04 DIAGNOSIS — E66.9 OBESITY, UNSPECIFIED: ICD-10-CM

## 2022-10-04 DIAGNOSIS — K21.9 GASTRO-ESOPHAGEAL REFLUX DISEASE WITHOUT ESOPHAGITIS: ICD-10-CM

## 2022-10-04 DIAGNOSIS — Z00.8 ENCOUNTER FOR OTHER GENERAL EXAMINATION: ICD-10-CM

## 2022-10-04 PROCEDURE — 74177 CT ABD & PELVIS W/CONTRAST: CPT | Mod: 26,MH

## 2022-10-04 PROCEDURE — 74177 CT ABD & PELVIS W/CONTRAST: CPT | Mod: MH

## 2023-01-13 ENCOUNTER — OUTPATIENT (OUTPATIENT)
Dept: OUTPATIENT SERVICES | Facility: HOSPITAL | Age: 78
LOS: 1 days | End: 2023-01-13
Payer: MEDICARE

## 2023-01-13 ENCOUNTER — APPOINTMENT (OUTPATIENT)
Dept: CT IMAGING | Facility: CLINIC | Age: 78
End: 2023-01-13
Payer: MEDICARE

## 2023-01-13 DIAGNOSIS — G47.33 OBSTRUCTIVE SLEEP APNEA (ADULT) (PEDIATRIC): ICD-10-CM

## 2023-01-13 DIAGNOSIS — R91.1 SOLITARY PULMONARY NODULE: ICD-10-CM

## 2023-01-13 PROCEDURE — 71250 CT THORAX DX C-: CPT | Mod: 26,MH

## 2023-01-13 PROCEDURE — 71250 CT THORAX DX C-: CPT

## 2023-02-02 ENCOUNTER — APPOINTMENT (OUTPATIENT)
Dept: PULMONOLOGY | Facility: CLINIC | Age: 78
End: 2023-02-02
Payer: MEDICARE

## 2023-02-02 VITALS
HEIGHT: 63.5 IN | HEART RATE: 70 BPM | RESPIRATION RATE: 16 BRPM | DIASTOLIC BLOOD PRESSURE: 80 MMHG | OXYGEN SATURATION: 97 % | BODY MASS INDEX: 38.85 KG/M2 | WEIGHT: 222 LBS | SYSTOLIC BLOOD PRESSURE: 138 MMHG

## 2023-02-02 PROCEDURE — 99215 OFFICE O/P EST HI 40 MIN: CPT

## 2023-02-02 RX ORDER — COLD-HOT PACK
EACH MISCELLANEOUS
Refills: 0 | Status: DISCONTINUED | COMMUNITY
End: 2023-02-02

## 2023-02-02 RX ORDER — METRONIDAZOLE 500 MG/1
500 TABLET ORAL
Qty: 30 | Refills: 0 | Status: DISCONTINUED | COMMUNITY
Start: 2022-06-10 | End: 2023-02-02

## 2023-02-02 RX ORDER — CHROMIUM 200 MCG
TABLET ORAL
Refills: 0 | Status: DISCONTINUED | COMMUNITY
End: 2023-02-02

## 2023-02-02 RX ORDER — SACCHAROMYCES BOULARDII 50 MG
250 CAPSULE ORAL
Refills: 0 | Status: DISCONTINUED | COMMUNITY
End: 2023-02-02

## 2023-02-02 NOTE — HISTORY OF PRESENT ILLNESS
[TextBox_4] : 76-year-old female with a history of severe obstructive sleep apnea refusing to wear CPAP. Seen today f/u CCT. No new complaints\par

## 2023-02-02 NOTE — CONSULT LETTER
[Dear  ___] : Dear  [unfilled], [Consult Letter:] : I had the pleasure of evaluating your patient, [unfilled]. [Please see my note below.] : Please see my note below. [Consult Closing:] : Thank you very much for allowing me to participate in the care of this patient.  If you have any questions, please do not hesitate to contact me. [Sincerely,] : Sincerely, [FreeTextEntry3] : Vince Loco MD FCCP\par Pulmonary/Critical Care/Sleep Medicine\par Department of Internal Medicine\par \par [facility]\par

## 2023-02-02 NOTE — DISCUSSION/SUMMARY
[FreeTextEntry1] : 76-year-old female seen today for follow-up of the above.  Lung lesion benign. No further w/u indicated.\par \par Pt was again  reminded of importance of STEVE and afib. Does not wish to pursue

## 2023-08-02 ENCOUNTER — APPOINTMENT (OUTPATIENT)
Dept: MRI IMAGING | Facility: CLINIC | Age: 78
End: 2023-08-02
Payer: MEDICARE

## 2023-08-02 ENCOUNTER — OUTPATIENT (OUTPATIENT)
Dept: OUTPATIENT SERVICES | Facility: HOSPITAL | Age: 78
LOS: 1 days | End: 2023-08-02
Payer: MEDICARE

## 2023-08-02 DIAGNOSIS — Z00.8 ENCOUNTER FOR OTHER GENERAL EXAMINATION: ICD-10-CM

## 2023-08-02 PROCEDURE — 70551 MRI BRAIN STEM W/O DYE: CPT | Mod: MH

## 2023-08-02 PROCEDURE — 70551 MRI BRAIN STEM W/O DYE: CPT | Mod: 26,MH

## 2023-08-28 ENCOUNTER — OUTPATIENT (OUTPATIENT)
Dept: OUTPATIENT SERVICES | Facility: HOSPITAL | Age: 78
LOS: 1 days | End: 2023-08-28
Payer: MEDICARE

## 2023-08-28 ENCOUNTER — APPOINTMENT (OUTPATIENT)
Dept: CT IMAGING | Facility: CLINIC | Age: 78
End: 2023-08-28
Payer: MEDICARE

## 2023-08-28 DIAGNOSIS — Z00.8 ENCOUNTER FOR OTHER GENERAL EXAMINATION: ICD-10-CM

## 2023-08-28 PROCEDURE — 74177 CT ABD & PELVIS W/CONTRAST: CPT | Mod: 26,MH

## 2023-08-28 PROCEDURE — 74177 CT ABD & PELVIS W/CONTRAST: CPT | Mod: MH

## 2023-09-06 ENCOUNTER — APPOINTMENT (OUTPATIENT)
Dept: DERMATOLOGY | Facility: CLINIC | Age: 78
End: 2023-09-06

## 2023-11-21 ENCOUNTER — EMERGENCY (EMERGENCY)
Facility: HOSPITAL | Age: 78
LOS: 1 days | Discharge: HOME | End: 2023-11-21
Attending: EMERGENCY MEDICINE
Payer: MEDICARE

## 2023-11-21 VITALS
TEMPERATURE: 98 F | SYSTOLIC BLOOD PRESSURE: 162 MMHG | DIASTOLIC BLOOD PRESSURE: 90 MMHG | HEART RATE: 81 BPM | OXYGEN SATURATION: 98 % | WEIGHT: 214.07 LBS | RESPIRATION RATE: 20 BRPM

## 2023-11-21 VITALS
DIASTOLIC BLOOD PRESSURE: 85 MMHG | SYSTOLIC BLOOD PRESSURE: 143 MMHG | OXYGEN SATURATION: 95 % | TEMPERATURE: 98 F | RESPIRATION RATE: 18 BRPM | HEART RATE: 66 BPM

## 2023-11-21 LAB
ALBUMIN SERPL ELPH-MCNC: 4.5 G/DL — SIGNIFICANT CHANGE UP (ref 3.3–5.2)
ALBUMIN SERPL ELPH-MCNC: 4.5 G/DL — SIGNIFICANT CHANGE UP (ref 3.3–5.2)
ALP SERPL-CCNC: 67 U/L — SIGNIFICANT CHANGE UP (ref 40–120)
ALP SERPL-CCNC: 67 U/L — SIGNIFICANT CHANGE UP (ref 40–120)
ALT FLD-CCNC: 25 U/L — SIGNIFICANT CHANGE UP
ALT FLD-CCNC: 25 U/L — SIGNIFICANT CHANGE UP
ANION GAP SERPL CALC-SCNC: 10 MMOL/L — SIGNIFICANT CHANGE UP (ref 5–17)
ANION GAP SERPL CALC-SCNC: 10 MMOL/L — SIGNIFICANT CHANGE UP (ref 5–17)
APPEARANCE UR: CLEAR — SIGNIFICANT CHANGE UP
APPEARANCE UR: CLEAR — SIGNIFICANT CHANGE UP
AST SERPL-CCNC: 31 U/L — SIGNIFICANT CHANGE UP
AST SERPL-CCNC: 31 U/L — SIGNIFICANT CHANGE UP
BACTERIA # UR AUTO: NEGATIVE /HPF — SIGNIFICANT CHANGE UP
BACTERIA # UR AUTO: NEGATIVE /HPF — SIGNIFICANT CHANGE UP
BASOPHILS # BLD AUTO: 0.05 K/UL — SIGNIFICANT CHANGE UP (ref 0–0.2)
BASOPHILS # BLD AUTO: 0.05 K/UL — SIGNIFICANT CHANGE UP (ref 0–0.2)
BASOPHILS NFR BLD AUTO: 0.5 % — SIGNIFICANT CHANGE UP (ref 0–2)
BASOPHILS NFR BLD AUTO: 0.5 % — SIGNIFICANT CHANGE UP (ref 0–2)
BILIRUB SERPL-MCNC: 0.5 MG/DL — SIGNIFICANT CHANGE UP (ref 0.4–2)
BILIRUB SERPL-MCNC: 0.5 MG/DL — SIGNIFICANT CHANGE UP (ref 0.4–2)
BILIRUB UR-MCNC: NEGATIVE — SIGNIFICANT CHANGE UP
BILIRUB UR-MCNC: NEGATIVE — SIGNIFICANT CHANGE UP
BUN SERPL-MCNC: 20.2 MG/DL — HIGH (ref 8–20)
BUN SERPL-MCNC: 20.2 MG/DL — HIGH (ref 8–20)
CALCIUM SERPL-MCNC: 9.3 MG/DL — SIGNIFICANT CHANGE UP (ref 8.4–10.5)
CALCIUM SERPL-MCNC: 9.3 MG/DL — SIGNIFICANT CHANGE UP (ref 8.4–10.5)
CAST: 4 /LPF — SIGNIFICANT CHANGE UP (ref 0–4)
CAST: 4 /LPF — SIGNIFICANT CHANGE UP (ref 0–4)
CHLORIDE SERPL-SCNC: 99 MMOL/L — SIGNIFICANT CHANGE UP (ref 96–108)
CHLORIDE SERPL-SCNC: 99 MMOL/L — SIGNIFICANT CHANGE UP (ref 96–108)
CO2 SERPL-SCNC: 30 MMOL/L — HIGH (ref 22–29)
CO2 SERPL-SCNC: 30 MMOL/L — HIGH (ref 22–29)
COLOR SPEC: YELLOW — SIGNIFICANT CHANGE UP
COLOR SPEC: YELLOW — SIGNIFICANT CHANGE UP
CREAT SERPL-MCNC: 0.91 MG/DL — SIGNIFICANT CHANGE UP (ref 0.5–1.3)
CREAT SERPL-MCNC: 0.91 MG/DL — SIGNIFICANT CHANGE UP (ref 0.5–1.3)
DIFF PNL FLD: ABNORMAL
DIFF PNL FLD: ABNORMAL
EGFR: 64 ML/MIN/1.73M2 — SIGNIFICANT CHANGE UP
EGFR: 64 ML/MIN/1.73M2 — SIGNIFICANT CHANGE UP
EOSINOPHIL # BLD AUTO: 0.06 K/UL — SIGNIFICANT CHANGE UP (ref 0–0.5)
EOSINOPHIL # BLD AUTO: 0.06 K/UL — SIGNIFICANT CHANGE UP (ref 0–0.5)
EOSINOPHIL NFR BLD AUTO: 0.7 % — SIGNIFICANT CHANGE UP (ref 0–6)
EOSINOPHIL NFR BLD AUTO: 0.7 % — SIGNIFICANT CHANGE UP (ref 0–6)
GLUCOSE SERPL-MCNC: 89 MG/DL — SIGNIFICANT CHANGE UP (ref 70–99)
GLUCOSE SERPL-MCNC: 89 MG/DL — SIGNIFICANT CHANGE UP (ref 70–99)
GLUCOSE UR QL: NEGATIVE MG/DL — SIGNIFICANT CHANGE UP
GLUCOSE UR QL: NEGATIVE MG/DL — SIGNIFICANT CHANGE UP
HCT VFR BLD CALC: 43 % — SIGNIFICANT CHANGE UP (ref 34.5–45)
HCT VFR BLD CALC: 43 % — SIGNIFICANT CHANGE UP (ref 34.5–45)
HGB BLD-MCNC: 14.9 G/DL — SIGNIFICANT CHANGE UP (ref 11.5–15.5)
HGB BLD-MCNC: 14.9 G/DL — SIGNIFICANT CHANGE UP (ref 11.5–15.5)
IMM GRANULOCYTES NFR BLD AUTO: 0.8 % — SIGNIFICANT CHANGE UP (ref 0–0.9)
IMM GRANULOCYTES NFR BLD AUTO: 0.8 % — SIGNIFICANT CHANGE UP (ref 0–0.9)
KETONES UR-MCNC: ABNORMAL MG/DL
KETONES UR-MCNC: ABNORMAL MG/DL
LEUKOCYTE ESTERASE UR-ACNC: ABNORMAL
LEUKOCYTE ESTERASE UR-ACNC: ABNORMAL
LYMPHOCYTES # BLD AUTO: 2.21 K/UL — SIGNIFICANT CHANGE UP (ref 1–3.3)
LYMPHOCYTES # BLD AUTO: 2.21 K/UL — SIGNIFICANT CHANGE UP (ref 1–3.3)
LYMPHOCYTES # BLD AUTO: 24 % — SIGNIFICANT CHANGE UP (ref 13–44)
LYMPHOCYTES # BLD AUTO: 24 % — SIGNIFICANT CHANGE UP (ref 13–44)
MCHC RBC-ENTMCNC: 31.2 PG — SIGNIFICANT CHANGE UP (ref 27–34)
MCHC RBC-ENTMCNC: 31.2 PG — SIGNIFICANT CHANGE UP (ref 27–34)
MCHC RBC-ENTMCNC: 34.7 GM/DL — SIGNIFICANT CHANGE UP (ref 32–36)
MCHC RBC-ENTMCNC: 34.7 GM/DL — SIGNIFICANT CHANGE UP (ref 32–36)
MCV RBC AUTO: 90 FL — SIGNIFICANT CHANGE UP (ref 80–100)
MCV RBC AUTO: 90 FL — SIGNIFICANT CHANGE UP (ref 80–100)
MONOCYTES # BLD AUTO: 0.99 K/UL — HIGH (ref 0–0.9)
MONOCYTES # BLD AUTO: 0.99 K/UL — HIGH (ref 0–0.9)
MONOCYTES NFR BLD AUTO: 10.7 % — SIGNIFICANT CHANGE UP (ref 2–14)
MONOCYTES NFR BLD AUTO: 10.7 % — SIGNIFICANT CHANGE UP (ref 2–14)
NEUTROPHILS # BLD AUTO: 5.84 K/UL — SIGNIFICANT CHANGE UP (ref 1.8–7.4)
NEUTROPHILS # BLD AUTO: 5.84 K/UL — SIGNIFICANT CHANGE UP (ref 1.8–7.4)
NEUTROPHILS NFR BLD AUTO: 63.3 % — SIGNIFICANT CHANGE UP (ref 43–77)
NEUTROPHILS NFR BLD AUTO: 63.3 % — SIGNIFICANT CHANGE UP (ref 43–77)
NITRITE UR-MCNC: NEGATIVE — SIGNIFICANT CHANGE UP
NITRITE UR-MCNC: NEGATIVE — SIGNIFICANT CHANGE UP
NT-PROBNP SERPL-SCNC: 200 PG/ML — SIGNIFICANT CHANGE UP (ref 0–300)
NT-PROBNP SERPL-SCNC: 200 PG/ML — SIGNIFICANT CHANGE UP (ref 0–300)
PH UR: 6 — SIGNIFICANT CHANGE UP (ref 5–8)
PH UR: 6 — SIGNIFICANT CHANGE UP (ref 5–8)
PLATELET # BLD AUTO: 333 K/UL — SIGNIFICANT CHANGE UP (ref 150–400)
PLATELET # BLD AUTO: 333 K/UL — SIGNIFICANT CHANGE UP (ref 150–400)
POTASSIUM SERPL-MCNC: 3.6 MMOL/L — SIGNIFICANT CHANGE UP (ref 3.5–5.3)
POTASSIUM SERPL-MCNC: 3.6 MMOL/L — SIGNIFICANT CHANGE UP (ref 3.5–5.3)
POTASSIUM SERPL-SCNC: 3.6 MMOL/L — SIGNIFICANT CHANGE UP (ref 3.5–5.3)
POTASSIUM SERPL-SCNC: 3.6 MMOL/L — SIGNIFICANT CHANGE UP (ref 3.5–5.3)
PROT SERPL-MCNC: 7.3 G/DL — SIGNIFICANT CHANGE UP (ref 6.6–8.7)
PROT SERPL-MCNC: 7.3 G/DL — SIGNIFICANT CHANGE UP (ref 6.6–8.7)
PROT UR-MCNC: NEGATIVE MG/DL — SIGNIFICANT CHANGE UP
PROT UR-MCNC: NEGATIVE MG/DL — SIGNIFICANT CHANGE UP
RBC # BLD: 4.78 M/UL — SIGNIFICANT CHANGE UP (ref 3.8–5.2)
RBC # BLD: 4.78 M/UL — SIGNIFICANT CHANGE UP (ref 3.8–5.2)
RBC # FLD: 13.2 % — SIGNIFICANT CHANGE UP (ref 10.3–14.5)
RBC # FLD: 13.2 % — SIGNIFICANT CHANGE UP (ref 10.3–14.5)
RBC CASTS # UR COMP ASSIST: 11 /HPF — HIGH (ref 0–4)
RBC CASTS # UR COMP ASSIST: 11 /HPF — HIGH (ref 0–4)
SODIUM SERPL-SCNC: 139 MMOL/L — SIGNIFICANT CHANGE UP (ref 135–145)
SODIUM SERPL-SCNC: 139 MMOL/L — SIGNIFICANT CHANGE UP (ref 135–145)
SP GR SPEC: 1.02 — SIGNIFICANT CHANGE UP (ref 1–1.03)
SP GR SPEC: 1.02 — SIGNIFICANT CHANGE UP (ref 1–1.03)
SQUAMOUS # UR AUTO: 5 /HPF — SIGNIFICANT CHANGE UP (ref 0–5)
SQUAMOUS # UR AUTO: 5 /HPF — SIGNIFICANT CHANGE UP (ref 0–5)
TROPONIN T, HIGH SENSITIVITY RESULT: 21 NG/L — SIGNIFICANT CHANGE UP (ref 0–51)
TROPONIN T, HIGH SENSITIVITY RESULT: 21 NG/L — SIGNIFICANT CHANGE UP (ref 0–51)
TROPONIN T, HIGH SENSITIVITY RESULT: 22 NG/L — SIGNIFICANT CHANGE UP (ref 0–51)
TROPONIN T, HIGH SENSITIVITY RESULT: 22 NG/L — SIGNIFICANT CHANGE UP (ref 0–51)
UROBILINOGEN FLD QL: 1 MG/DL — SIGNIFICANT CHANGE UP (ref 0.2–1)
UROBILINOGEN FLD QL: 1 MG/DL — SIGNIFICANT CHANGE UP (ref 0.2–1)
WBC # BLD: 9.22 K/UL — SIGNIFICANT CHANGE UP (ref 3.8–10.5)
WBC # BLD: 9.22 K/UL — SIGNIFICANT CHANGE UP (ref 3.8–10.5)
WBC # FLD AUTO: 9.22 K/UL — SIGNIFICANT CHANGE UP (ref 3.8–10.5)
WBC # FLD AUTO: 9.22 K/UL — SIGNIFICANT CHANGE UP (ref 3.8–10.5)
WBC UR QL: 30 /HPF — HIGH (ref 0–5)
WBC UR QL: 30 /HPF — HIGH (ref 0–5)

## 2023-11-21 PROCEDURE — 99285 EMERGENCY DEPT VISIT HI MDM: CPT

## 2023-11-21 PROCEDURE — 83880 ASSAY OF NATRIURETIC PEPTIDE: CPT

## 2023-11-21 PROCEDURE — 93005 ELECTROCARDIOGRAM TRACING: CPT

## 2023-11-21 PROCEDURE — 85025 COMPLETE CBC W/AUTO DIFF WBC: CPT

## 2023-11-21 PROCEDURE — 84484 ASSAY OF TROPONIN QUANT: CPT

## 2023-11-21 PROCEDURE — 81001 URINALYSIS AUTO W/SCOPE: CPT

## 2023-11-21 PROCEDURE — 36415 COLL VENOUS BLD VENIPUNCTURE: CPT

## 2023-11-21 PROCEDURE — 80053 COMPREHEN METABOLIC PANEL: CPT

## 2023-11-21 PROCEDURE — 96375 TX/PRO/DX INJ NEW DRUG ADDON: CPT

## 2023-11-21 PROCEDURE — 96374 THER/PROPH/DIAG INJ IV PUSH: CPT

## 2023-11-21 PROCEDURE — 93010 ELECTROCARDIOGRAM REPORT: CPT

## 2023-11-21 PROCEDURE — 99284 EMERGENCY DEPT VISIT MOD MDM: CPT | Mod: 25

## 2023-11-21 RX ORDER — METOCLOPRAMIDE HCL 10 MG
5 TABLET ORAL ONCE
Refills: 0 | Status: COMPLETED | OUTPATIENT
Start: 2023-11-21 | End: 2023-11-21

## 2023-11-21 RX ORDER — CEPHALEXIN 500 MG
1 CAPSULE ORAL
Qty: 21 | Refills: 0
Start: 2023-11-21 | End: 2023-11-27

## 2023-11-21 RX ORDER — METHOCARBAMOL 500 MG/1
750 TABLET, FILM COATED ORAL ONCE
Refills: 0 | Status: DISCONTINUED | OUTPATIENT
Start: 2023-11-21 | End: 2023-11-21

## 2023-11-21 RX ORDER — METOCLOPRAMIDE HCL 10 MG
5 TABLET ORAL ONCE
Refills: 0 | Status: DISCONTINUED | OUTPATIENT
Start: 2023-11-21 | End: 2023-11-21

## 2023-11-21 RX ORDER — CEFTRIAXONE 500 MG/1
1000 INJECTION, POWDER, FOR SOLUTION INTRAMUSCULAR; INTRAVENOUS ONCE
Refills: 0 | Status: DISCONTINUED | OUTPATIENT
Start: 2023-11-21 | End: 2023-11-21

## 2023-11-21 RX ORDER — CEFTRIAXONE 500 MG/1
1000 INJECTION, POWDER, FOR SOLUTION INTRAMUSCULAR; INTRAVENOUS ONCE
Refills: 0 | Status: COMPLETED | OUTPATIENT
Start: 2023-11-21 | End: 2023-11-21

## 2023-11-21 RX ORDER — SODIUM CHLORIDE 9 MG/ML
500 INJECTION INTRAMUSCULAR; INTRAVENOUS; SUBCUTANEOUS ONCE
Refills: 0 | Status: COMPLETED | OUTPATIENT
Start: 2023-11-21 | End: 2023-11-21

## 2023-11-21 RX ORDER — ACETAMINOPHEN 500 MG
1000 TABLET ORAL ONCE
Refills: 0 | Status: COMPLETED | OUTPATIENT
Start: 2023-11-21 | End: 2023-11-21

## 2023-11-21 RX ADMIN — Medication 5 MILLIGRAM(S): at 13:02

## 2023-11-21 RX ADMIN — Medication 1000 MILLIGRAM(S): at 14:30

## 2023-11-21 RX ADMIN — Medication 400 MILLIGRAM(S): at 13:02

## 2023-11-21 RX ADMIN — CEFTRIAXONE 1000 MILLIGRAM(S): 500 INJECTION, POWDER, FOR SOLUTION INTRAMUSCULAR; INTRAVENOUS at 17:20

## 2023-11-21 RX ADMIN — SODIUM CHLORIDE 500 MILLILITER(S): 9 INJECTION INTRAMUSCULAR; INTRAVENOUS; SUBCUTANEOUS at 13:03

## 2023-11-21 NOTE — ED PROVIDER NOTE - PHYSICAL EXAMINATION
Gen: obese, seem tired and dry  Head: normocephalic, atraumatic  EENT: EOMI, PERRLA, dry mucous membranes, no scleral icterus, no JVD  Lung: no increased work of breathing, clear to auscultation bilaterally, no wheezing, rales, rhonchi, speaking in full sentences  CV: regular rate, regular rhythm, normal s1/s2, 2+ radial pulses bilaterally  Abd: soft, non-tender, non-distended, no rebound tenderness or guarding; no CVA tenderness  MSK: No edema, no visible deformities, full range of motion in all 4 extremities  Neuro: Awake, alert, no focal neurologic deficits, intact sensation besides left face, droopy left forehead, eyelid, and mouth corner  Skin: No obvious rash, no jaundice  Psych: normal affect, normal speech

## 2023-11-21 NOTE — ED PROVIDER NOTE - NSICDXPASTMEDICALHX_GEN_ALL_CORE_FT
PAST MEDICAL HISTORY:  Chronic atrial fibrillation     Diabetes     H/O Bell's palsy     HTN (hypertension)     Vertigo

## 2023-11-21 NOTE — ED PROVIDER NOTE - CLINICAL SUMMARY MEDICAL DECISION MAKING FREE TEXT BOX
77 y/o female w/ PMH A.  Fib on ELIQUIS (loop recorder), HBP, DM, Vertigo. Seen on Friday on [facility] c/o left face "droopy", MRI done and central causes r/o, pt diagnosed w/ bell's palsy. Comes to ED today do to "weakness" and malaise, not drinking to much water and not feeling herself, skipped blood pressure medication today and BP was 180/110 by EMS. Pt now Alert and oriented x3, c/o weakness, neck and headache, left face hemiparesis w/o forehead sparing. Intact sensation, 5/5 strength.     Plan:  1. Blood work  2. Imaging already done at [facility] MRI negative  3. NS bolus  4. Ofirmev and Reglan  5. Ua

## 2023-11-21 NOTE — ED PROVIDER NOTE - ATTENDING CONTRIBUTION TO CARE
The patient seen with resident    Dyspnea  UTI    I, Mitch Bear, performed the initial face to face bedside interview with this patient regarding history of present illness, review of symptoms and relevant past medical, social and family history.  I completed an independent physical examination.  I was the initial provider who evaluated this patient. I have signed out the follow up of any pending tests (i.e. labs, radiological studies) to the resident.  I have communicated the patient’s plan of care and disposition with the resident.

## 2023-11-21 NOTE — ED ADULT NURSE REASSESSMENT NOTE - NS ED NURSE REASSESS COMMENT FT1
Pt AOx4, states IV Tylenol helped alleviate headache and neck pain. No further complaints at this time.

## 2023-11-21 NOTE — ED ADULT TRIAGE NOTE - CHIEF COMPLAINT QUOTE
recently finished medication for bellspalsy. c/o SOB indigestion, and feeling weak. given 1 nitro and 324 ASA by EMS

## 2023-11-21 NOTE — ED ADULT NURSE NOTE - NSFALLHARMRISKINTERV_ED_ALL_ED

## 2023-11-21 NOTE — ED PROVIDER NOTE - OBJECTIVE STATEMENT
79 y/o female w/ PMH A.  Fib on ELIQUIS (loop recorder), HBP, DM, Vertigo. Seen on Friday on [facility] c/o left face "droopy", MRI done and central causes r/o, pt diagnosed w/ bell's palsy. Comes to ED today do to "weakness" and malaise, not drinking to much water and not feeling herself, skipped blood pressure medication today and BP was 180/110 by EMS. Pt now Alert and oriented x3, c/o weakness, neck and headache, left face hemiparesis w/o forehead sparing. Intact sensation, 5/5 strength.

## 2023-11-21 NOTE — ED PROVIDER NOTE - NS ED ROS FT
Gen: generalized weakness  ENT: No congestion, no rhinorrhea  Resp: + cough, no trouble breathing  Cardiovascular: No chest pain, no palpitation  Gastrointestinal: No nausea, no vomiting, no diarrhea  :  No change in urine output; no dysuria, no hematuria  MS: +neck pain  Skin: No rashes  Neuro: + headache; no abnormal movements  Remainder negative, except as per the HPI

## 2023-11-21 NOTE — ED PROVIDER NOTE - PATIENT PORTAL LINK FT
You can access the FollowMyHealth Patient Portal offered by [facility] by registering at the following website: [email or web address]. By joining [employer]’s FollowMyHealth portal, you will also be able to view your health information using other applications (apps) compatible with our system.

## 2023-11-21 NOTE — ED ADULT NURSE NOTE - OBJECTIVE STATEMENT
Pt AOx4, RR even and unlabored. Pt presents with weakness and neck pain. Pt states she was recently diagnosed with Kennard Palsy. Dysphagia screen done, pt failed and MD made aware.

## 2023-11-21 NOTE — CONSULT NOTE ADULT - SUBJECTIVE AND OBJECTIVE BOX
[facility]                                   [address] [place] [address]                                                      PHONE: [phone]                                                         FAX: [phone]  [email or web address]  ---------------------------------------------------------------------------------------------------------------------------------    HPI:  KIMBERLY QUEZADA is an 78y Female with a hx of afib, htn, hld, NIDDM, STEVE, generalized anxiety p/w elevated BP.  She was dx with bells palsy last week.  She was not taking her clonazepam for the past few days and felt anxious.  She checked her bp today and it was elevated (but had skipped her BP meds).  She denies chest pain, dyspnea, headache or blurry vision.     PAST MEDICAL & SURGICAL HISTORY:  Chronic atrial fibrillation      HTN (hypertension)      Vertigo      Diabetes      H/O Bell's palsy          No Known Allergies      MEDICATIONS  (STANDING):    MEDICATIONS  (PRN):      Family History: Pt denies hx of early cad, SCD, or congenital heart disease.      Social History:  Cigarettes:              no      Alchohol:   no              Illicit Drug Abuse:  no    ROS:    Extensively Reviewed with pertinents as per HPI the remainder were negative.      Vital Signs Last 24 Hrs  T(C): 36.9 (2023 10:51), Max: 36.9 (2023 10:51)  T(F): 98.5 (2023 10:51), Max: 98.5 (2023 10:51)  HR: 68 (2023 16:24) (68 - 81)  BP: 159/76 (2023 16:24) (159/76 - 162/90)  BP(mean): --  RR: 18 (2023 16:24) (18 - 20)  SpO2: 96% (2023 16:24) (96% - 98%)    Parameters below as of 2023 16:24  Patient On (Oxygen Delivery Method): room air      ICU Vital Signs Last 24 Hrs  KIMBERLY QUEZADA  I&O's Detail    I&O's Summary    Drug Dosing Weight  KIMBERLY QUEZADA      PHYSICAL EXAM:  General: Appears well developed, alert and cooperative.  HEENT: Head; normocephalic, atraumatic.  Eyes: Pupils reactive, cornea wnl.  Neck: Supple, no nodes adenopathy, no JVD, no carotid bruit  CARDIOVASCULAR: Normal S1 and S2, No murmur, rub, gallop or lift.   LUNGS: No rales, rhonchi or wheeze. Normal breath sounds bilaterally.  ABDOMEN: Soft, nontender, nondistended  EXTREMITIES: No clubbing or edema. Distal pulses wnl.   SKIN: warm and dry with normal turgor.  NEURO: Alert/oriented x 3/normal motor exam.   PSYCH: normal affect.        LABS:                        14.9   9.22  )-----------( 333      ( 2023 12:38 )             43.0     11-    139  |  99  |  20.2<H>  ----------------------------<  89  3.6   |  30.0<H>  |  0.91    Ca    9.3      2023 12:38    TPro  7.3  /  Alb  4.5  /  TBili  0.5  /  DBili  x   /  AST  31  /  ALT  25  /  AlkPhos  67  11-    KIMBERLYLLOYD QUEZADA        Urinalysis Basic - ( 2023 15:02 )    Color: Yellow / Appearance: Clear / S.023 / pH: x  Gluc: x / Ketone: Trace mg/dL  / Bili: Negative / Urobili: 1.0 mg/dL   Blood: x / Protein: Negative mg/dL / Nitrite: Negative   Leuk Esterase: Moderate / RBC: 11 /HPF / WBC 30 /HPF   Sq Epi: x / Non Sq Epi: 5 /HPF / Bacteria: Negative /HPF        RADIOLOGY & ADDITIONAL STUDIES:    INTERPRETATION OF TELEMETRY (personally reviewed):    ECG:    ECHO:    STRESS TEST:    CARDIAC CATHETERIZATION:    Assessment and Plan:  In summary, KIMBERLY QUEZADA is an 78y Female with past medical history significant for afib, htn, hld, NIDDM, bells palsy p/w htn urgency.    HTn urgency- bp is back to normal.  Restart home meds    Afib- continue eliquis    DC to home      Thank you for allowing [facility] to participate in the care of this patient.  Please feel free to call with any questions or concerns.

## 2024-06-03 PROBLEM — Z86.69 PERSONAL HISTORY OF OTHER DISEASES OF THE NERVOUS SYSTEM AND SENSE ORGANS: Chronic | Status: ACTIVE | Noted: 2023-11-21

## 2024-06-03 PROBLEM — I48.20 CHRONIC ATRIAL FIBRILLATION, UNSPECIFIED: Chronic | Status: ACTIVE | Noted: 2023-11-21

## 2024-06-03 PROBLEM — E11.9 TYPE 2 DIABETES MELLITUS WITHOUT COMPLICATIONS: Chronic | Status: ACTIVE | Noted: 2023-11-21

## 2024-06-03 PROBLEM — I10 ESSENTIAL (PRIMARY) HYPERTENSION: Chronic | Status: ACTIVE | Noted: 2023-11-21

## 2024-06-03 PROBLEM — R42 DIZZINESS AND GIDDINESS: Chronic | Status: ACTIVE | Noted: 2023-11-21

## 2024-06-04 ENCOUNTER — APPOINTMENT (OUTPATIENT)
Dept: PULMONOLOGY | Facility: CLINIC | Age: 79
End: 2024-06-04
Payer: MEDICARE

## 2024-06-04 VITALS
SYSTOLIC BLOOD PRESSURE: 128 MMHG | RESPIRATION RATE: 16 BRPM | OXYGEN SATURATION: 97 % | WEIGHT: 204 LBS | HEART RATE: 72 BPM | BODY MASS INDEX: 35.7 KG/M2 | DIASTOLIC BLOOD PRESSURE: 77 MMHG | HEIGHT: 63.5 IN

## 2024-06-04 DIAGNOSIS — G47.33 OBSTRUCTIVE SLEEP APNEA (ADULT) (PEDIATRIC): ICD-10-CM

## 2024-06-04 DIAGNOSIS — R91.1 SOLITARY PULMONARY NODULE: ICD-10-CM

## 2024-06-04 PROCEDURE — G2211 COMPLEX E/M VISIT ADD ON: CPT

## 2024-06-04 PROCEDURE — 99214 OFFICE O/P EST MOD 30 MIN: CPT

## 2024-06-04 NOTE — DISCUSSION/SUMMARY
[FreeTextEntry1] : 78-year-old female seen today for the above.  Patient's groundglass infiltrate is most likely benign but will continue to follow-up with the patient's request.  No clinical evidence of progression.  In light of the patient's continued atrial fibrillation as well as other arrhythmias she now wishes to pursue reevaluation and treatment for obstructive sleep apnea.  Home sleep study will be ordered with follow-up thereafter

## 2024-06-04 NOTE — CONSULT LETTER
[Dear  ___] : Dear  [unfilled], [Consult Letter:] : I had the pleasure of evaluating your patient, [unfilled]. [Please see my note below.] : Please see my note below. [Consult Closing:] : Thank you very much for allowing me to participate in the care of this patient.  If you have any questions, please do not hesitate to contact me. [Sincerely,] : Sincerely, [FreeTextEntry3] : Vince Loco MD FCCP\par  Pulmonary/Critical Care/Sleep Medicine\par  Department of Internal Medicine\par  \par  [facility]\par

## 2024-06-04 NOTE — HISTORY OF PRESENT ILLNESS
[TextBox_4] : 78-year-old female with a history of severe obstructive sleep apnea with an AHI of 30 diagnosed in 2019 but refusing therapy seen today in follow-up.  Patient has also had a groundglass nodule which was followed for 2 years without any significant change.  She currently denies any new complaints of cough wheeze or shortness of breath.  She continues to be treated for arrhythmias including atrial fibrillation.  History remains positive for snoring, daytime somnolence, unrefreshing sleep

## 2024-06-05 ENCOUNTER — APPOINTMENT (OUTPATIENT)
Dept: CT IMAGING | Facility: CLINIC | Age: 79
End: 2024-06-05
Payer: MEDICARE

## 2024-06-05 ENCOUNTER — OUTPATIENT (OUTPATIENT)
Dept: OUTPATIENT SERVICES | Facility: HOSPITAL | Age: 79
LOS: 1 days | End: 2024-06-05
Payer: MEDICARE

## 2024-06-05 DIAGNOSIS — R91.1 SOLITARY PULMONARY NODULE: ICD-10-CM

## 2024-06-05 PROCEDURE — 71250 CT THORAX DX C-: CPT | Mod: 26,MH

## 2024-06-05 PROCEDURE — 71250 CT THORAX DX C-: CPT

## 2024-06-12 ENCOUNTER — NON-APPOINTMENT (OUTPATIENT)
Age: 79
End: 2024-06-12

## 2024-06-14 ENCOUNTER — OUTPATIENT (OUTPATIENT)
Dept: OUTPATIENT SERVICES | Facility: HOSPITAL | Age: 79
LOS: 1 days | End: 2024-06-14
Payer: MEDICARE

## 2024-06-14 DIAGNOSIS — G47.33 OBSTRUCTIVE SLEEP APNEA (ADULT) (PEDIATRIC): ICD-10-CM

## 2024-06-14 PROCEDURE — 95800 SLP STDY UNATTENDED: CPT

## 2024-06-14 PROCEDURE — G0400: CPT | Mod: 26

## 2024-07-10 ENCOUNTER — OUTPATIENT (OUTPATIENT)
Dept: OUTPATIENT SERVICES | Facility: HOSPITAL | Age: 79
LOS: 1 days | End: 2024-07-10
Payer: MEDICARE

## 2024-07-10 ENCOUNTER — APPOINTMENT (OUTPATIENT)
Dept: MRI IMAGING | Facility: CLINIC | Age: 79
End: 2024-07-10
Payer: MEDICARE

## 2024-07-10 DIAGNOSIS — Z00.00 ENCOUNTER FOR GENERAL ADULT MEDICAL EXAMINATION WITHOUT ABNORMAL FINDINGS: ICD-10-CM

## 2024-07-10 PROCEDURE — 72141 MRI NECK SPINE W/O DYE: CPT | Mod: MH

## 2024-07-10 PROCEDURE — 72141 MRI NECK SPINE W/O DYE: CPT | Mod: 26,MH

## 2024-08-14 ENCOUNTER — APPOINTMENT (OUTPATIENT)
Dept: PULMONOLOGY | Facility: CLINIC | Age: 79
End: 2024-08-14

## 2024-10-31 ENCOUNTER — APPOINTMENT (OUTPATIENT)
Dept: DERMATOLOGY | Facility: CLINIC | Age: 79
End: 2024-10-31
Payer: MEDICARE

## 2024-10-31 PROCEDURE — 99213 OFFICE O/P EST LOW 20 MIN: CPT

## 2025-02-25 ENCOUNTER — APPOINTMENT (OUTPATIENT)
Dept: DERMATOLOGY | Facility: CLINIC | Age: 80
End: 2025-02-25

## 2025-04-10 ENCOUNTER — APPOINTMENT (OUTPATIENT)
Dept: DERMATOLOGY | Facility: CLINIC | Age: 80
End: 2025-04-10
Payer: MEDICARE

## 2025-04-10 PROCEDURE — 17110 DESTRUCTION B9 LES UP TO 14: CPT

## 2025-04-10 PROCEDURE — 99213 OFFICE O/P EST LOW 20 MIN: CPT | Mod: 25

## 2025-04-10 PROCEDURE — 17000 DESTRUCT PREMALG LESION: CPT | Mod: 59

## 2025-04-10 PROCEDURE — 17003 DESTRUCT PREMALG LES 2-14: CPT | Mod: 59
